# Patient Record
Sex: FEMALE | Race: WHITE | URBAN - METROPOLITAN AREA
[De-identification: names, ages, dates, MRNs, and addresses within clinical notes are randomized per-mention and may not be internally consistent; named-entity substitution may affect disease eponyms.]

---

## 2023-07-10 ENCOUNTER — TELEPHONE (OUTPATIENT)
Dept: SURGERY | Age: 63
End: 2023-07-10

## 2023-07-23 NOTE — PROGRESS NOTES
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 female who was referred for above concerns. Symptom onset began at the age 12 years when knee swelling and pain began, then it affected the neck, lower spine then feet and finally hands. Over the years, she was not  referred to rheumatologic because her blood work was fine. She was treated with mobic, celebrex and naproxen. She is currently on mobic 15mg daily and tylenol 1gm bid. She has tried PT for knees and lower back    Disease progression:   Today she has neck, low back pain, hands and feet   There is swelling in her hands   Joint stiffness in am lasting for hours   Relieving factors: pain meds   Worsening factors: activity   Associated symptoms: low grade, 99-100F,  SOB+   Pain level today: 6/10  Sees pulmonologist for lung nodules and follows with endocrinologist for thyroid nodules. Gall bladder nodules was recently found. She is a  at the [employer]  Functional status: walks with a walker     Other rheumatologic symptoms :  Denies chest pain, SOB, fever, rash, oral/nasal ulcers, change in mental status, sicca symptoms, Muscle pain, muscle weakness, raynaud's, puffy fingers or skin thickening. History of  blood clots, dactylitis, uveitis, enthesitis, psoriasis, buttock pain, change in BM    Risk factors: Denies smoking, no etoh, + obesity,no recreational drug use,her parents had arthritis / deformity. Her grandparents also had arthritis. No hx of psoriasis     Current rheum meds: Mobic 15mg daily     Past rheum meds:     No flowsheet data found.         REVIEW OF SYSTEMS     Constitutional:  Denies fever or chills, decreased appetite, or weight loss   Eyes:

## 2023-07-26 ENCOUNTER — OFFICE VISIT (OUTPATIENT)
Dept: RHEUMATOLOGY | Age: 63
End: 2023-07-26
Payer: OTHER GOVERNMENT

## 2023-07-26 ENCOUNTER — OFFICE VISIT (OUTPATIENT)
Dept: SURGERY | Age: 63
End: 2023-07-26
Payer: OTHER GOVERNMENT

## 2023-07-26 VITALS
WEIGHT: 236.4 LBS | HEIGHT: 59 IN | HEART RATE: 100 BPM | OXYGEN SATURATION: 96 % | BODY MASS INDEX: 47.66 KG/M2 | DIASTOLIC BLOOD PRESSURE: 68 MMHG | SYSTOLIC BLOOD PRESSURE: 118 MMHG

## 2023-07-26 VITALS
HEART RATE: 100 BPM | SYSTOLIC BLOOD PRESSURE: 110 MMHG | OXYGEN SATURATION: 97 % | DIASTOLIC BLOOD PRESSURE: 80 MMHG | WEIGHT: 236 LBS

## 2023-07-26 DIAGNOSIS — Z87.898 HISTORY OF MULTIPLE PULMONARY NODULES: ICD-10-CM

## 2023-07-26 DIAGNOSIS — M15.4 EROSIVE OSTEOARTHRITIS: ICD-10-CM

## 2023-07-26 DIAGNOSIS — M25.50 POLYARTHRALGIA: Primary | ICD-10-CM

## 2023-07-26 DIAGNOSIS — R06.02 SOB (SHORTNESS OF BREATH): ICD-10-CM

## 2023-07-26 DIAGNOSIS — R93.41 ABNORMAL ULTRASOUND OF BLADDER: Primary | ICD-10-CM

## 2023-07-26 DIAGNOSIS — Z01.89 ENCOUNTER FOR OTHER SPECIFIED SPECIAL EXAMINATIONS: ICD-10-CM

## 2023-07-26 PROCEDURE — 99205 OFFICE O/P NEW HI 60 MIN: CPT | Performed by: STUDENT IN AN ORGANIZED HEALTH CARE EDUCATION/TRAINING PROGRAM

## 2023-07-26 PROCEDURE — 99203 OFFICE O/P NEW LOW 30 MIN: CPT | Performed by: SURGERY

## 2023-07-26 RX ORDER — LISINOPRIL AND HYDROCHLOROTHIAZIDE 12.5; 1 MG/1; MG/1
1 TABLET ORAL DAILY
COMMUNITY

## 2023-07-26 RX ORDER — MELOXICAM 15 MG/1
15 TABLET ORAL DAILY
COMMUNITY

## 2023-07-26 RX ORDER — OMEPRAZOLE 40 MG/1
40 CAPSULE, DELAYED RELEASE ORAL DAILY
COMMUNITY

## 2023-07-26 RX ORDER — METFORMIN HYDROCHLORIDE 500 MG/1
500 TABLET, EXTENDED RELEASE ORAL
COMMUNITY

## 2023-07-26 ASSESSMENT — PATIENT HEALTH QUESTIONNAIRE - PHQ9
SUM OF ALL RESPONSES TO PHQ QUESTIONS 1-9: 0
SUM OF ALL RESPONSES TO PHQ QUESTIONS 1-9: 0
SUM OF ALL RESPONSES TO PHQ9 QUESTIONS 1 & 2: 0
SUM OF ALL RESPONSES TO PHQ QUESTIONS 1-9: 0
1. LITTLE INTEREST OR PLEASURE IN DOING THINGS: 0
2. FEELING DOWN, DEPRESSED OR HOPELESS: 0
SUM OF ALL RESPONSES TO PHQ QUESTIONS 1-9: 0

## 2023-07-28 NOTE — PROGRESS NOTES
SUBJECTIVE:  HPI:     Patient here for evaluation for cholelithiasis. She has 1.9 cm gallstone without findings of acute cholecystitis. Patient denies postprandial pain. Denies right upper quadrant pain. She does report some occasional nausea and vomiting but not associated with meals. Denies changes in bowel habits. Denies fever/chills. No past surgical history on file. No past medical history on file. No family history on file.   Social History     Socioeconomic History    Marital status:      Spouse name: Not on file    Number of children: Not on file    Years of education: Not on file    Highest education level: Not on file   Occupational History    Not on file   Tobacco Use    Smoking status: Never    Smokeless tobacco: Never   Substance and Sexual Activity    Alcohol use: Not Currently    Drug use: Never    Sexual activity: Not on file   Other Topics Concern    Not on file   Social History Narrative    Not on file     Social Determinants of Health     Financial Resource Strain: Not on file   Food Insecurity: Not on file   Transportation Needs: Not on file   Physical Activity: Not on file   Stress: Not on file   Social Connections: Not on file   Intimate Partner Violence: Not on file   Housing Stability: Not on file       Current Outpatient Medications   Medication Sig Dispense Refill    omeprazole (PRILOSEC) 40 MG delayed release capsule Take 1 capsule by mouth daily      lisinopril-hydroCHLOROthiazide (PRINZIDE;ZESTORETIC) 10-12.5 MG per tablet Take 1 tablet by mouth daily      meloxicam (MOBIC) 15 MG tablet Take 1 tablet by mouth daily      metFORMIN (GLUCOPHAGE-XR) 500 MG extended release tablet Take 1 tablet by mouth daily (with breakfast)      Multiple Vitamin (MULTIVITAMIN ADULT PO) Take by mouth      Acetaminophen (TYLENOL 8 HOUR PO) Take by mouth      diphenhydrAMINE-APAP, sleep, (TYLENOL PM EXTRA STRENGTH PO) Take by mouth       No current facility-administered medications for this

## 2023-07-29 ENCOUNTER — HOSPITAL ENCOUNTER (OUTPATIENT)
Dept: GENERAL RADIOLOGY | Age: 63
Discharge: HOME | End: 2023-07-29
Payer: OTHER GOVERNMENT

## 2023-07-29 ENCOUNTER — HOSPITAL ENCOUNTER (OUTPATIENT)
Age: 63
Discharge: HOME | End: 2023-07-29
Payer: OTHER GOVERNMENT

## 2023-07-29 DIAGNOSIS — M15.4 EROSIVE OSTEOARTHRITIS: ICD-10-CM

## 2023-07-29 DIAGNOSIS — Z01.89 ENCOUNTER FOR OTHER SPECIFIED SPECIAL EXAMINATIONS: ICD-10-CM

## 2023-07-29 DIAGNOSIS — M25.50 POLYARTHRALGIA: ICD-10-CM

## 2023-07-29 DIAGNOSIS — R06.02 SOB (SHORTNESS OF BREATH): ICD-10-CM

## 2023-07-29 DIAGNOSIS — Z87.898 HISTORY OF MULTIPLE PULMONARY NODULES: ICD-10-CM

## 2023-07-29 LAB
25(OH)D3 SERPL-MCNC: 69.94 NG/ML
ALBUMIN SERPL-MCNC: 4.1 GM/DL (ref 3.4–5)
ALBUMIN SERPL-MCNC: 4.1 GM/DL (ref 3.4–5)
ALP BLD-CCNC: 82 IU/L (ref 40–129)
ALT SERPL-CCNC: 25 U/L (ref 10–40)
ANION GAP SERPL CALCULATED.3IONS-SCNC: 12 MMOL/L (ref 4–16)
AST SERPL-CCNC: 17 IU/L (ref 15–37)
BILIRUB SERPL-MCNC: 0.4 MG/DL (ref 0–1)
BILIRUBIN DIRECT: 0.2 MG/DL (ref 0–0.3)
BILIRUBIN, INDIRECT: 0.2 MG/DL (ref 0–0.7)
BUN SERPL-MCNC: 17 MG/DL (ref 6–23)
CALCIUM SERPL-MCNC: 9.7 MG/DL (ref 8.3–10.6)
CHLORIDE BLD-SCNC: 98 MMOL/L (ref 99–110)
CO2: 28 MMOL/L (ref 21–32)
CREAT SERPL-MCNC: 0.7 MG/DL (ref 0.6–1.1)
CRP SERPL HS-MCNC: 4 MG/L
ERYTHROCYTE SEDIMENTATION RATE: 7 MM/HR (ref 0–30)
GFR SERPL CREATININE-BSD FRML MDRD: >60 ML/MIN/1.73M2
GLUCOSE SERPL-MCNC: 173 MG/DL (ref 70–99)
HAV IGM SERPL QL IA: NON REACTIVE
HBV CORE IGM SERPL QL IA: NON REACTIVE
HBV SURFACE AG SERPL QL IA: NON REACTIVE
HCT VFR BLD CALC: 41.8 % (ref 37–47)
HCV AB SERPL QL IA: NON REACTIVE
HEMOGLOBIN: 13.1 GM/DL (ref 12.5–16)
MCH RBC QN AUTO: 29 PG (ref 27–31)
MCHC RBC AUTO-ENTMCNC: 31.3 % (ref 32–36)
MCV RBC AUTO: 92.7 FL (ref 78–100)
PDW BLD-RTO: 12.9 % (ref 11.7–14.9)
PHOSPHORUS: 3.1 MG/DL (ref 2.5–4.9)
PLATELET # BLD: 281 K/CU MM (ref 140–440)
PMV BLD AUTO: 11 FL (ref 7.5–11.1)
POTASSIUM SERPL-SCNC: 3.8 MMOL/L (ref 3.5–5.1)
RBC # BLD: 4.51 M/CU MM (ref 4.2–5.4)
SODIUM BLD-SCNC: 138 MMOL/L (ref 135–145)
TOTAL PROTEIN: 6.2 GM/DL (ref 6.4–8.2)
WBC # BLD: 7.2 K/CU MM (ref 4–10.5)

## 2023-07-29 PROCEDURE — 72100 X-RAY EXAM L-S SPINE 2/3 VWS: CPT

## 2023-07-29 PROCEDURE — 86480 TB TEST CELL IMMUN MEASURE: CPT

## 2023-07-29 PROCEDURE — 80074 ACUTE HEPATITIS PANEL: CPT

## 2023-07-29 PROCEDURE — 85652 RBC SED RATE AUTOMATED: CPT

## 2023-07-29 PROCEDURE — 86812 HLA TYPING A B OR C: CPT

## 2023-07-29 PROCEDURE — 82306 VITAMIN D 25 HYDROXY: CPT

## 2023-07-29 PROCEDURE — 73130 X-RAY EXAM OF HAND: CPT

## 2023-07-29 PROCEDURE — 86200 CCP ANTIBODY: CPT

## 2023-07-29 PROCEDURE — 82248 BILIRUBIN DIRECT: CPT

## 2023-07-29 PROCEDURE — 36415 COLL VENOUS BLD VENIPUNCTURE: CPT

## 2023-07-29 PROCEDURE — 85027 COMPLETE CBC AUTOMATED: CPT

## 2023-07-29 PROCEDURE — 86430 RHEUMATOID FACTOR TEST QUAL: CPT

## 2023-07-29 PROCEDURE — 86140 C-REACTIVE PROTEIN: CPT

## 2023-07-29 PROCEDURE — 80053 COMPREHEN METABOLIC PANEL: CPT

## 2023-07-29 PROCEDURE — 72202 X-RAY EXAM SI JOINTS 3/> VWS: CPT

## 2023-07-29 PROCEDURE — 71046 X-RAY EXAM CHEST 2 VIEWS: CPT

## 2023-07-29 PROCEDURE — 84100 ASSAY OF PHOSPHORUS: CPT

## 2023-07-31 ENCOUNTER — TELEPHONE (OUTPATIENT)
Dept: RHEUMATOLOGY | Age: 63
End: 2023-07-31

## 2023-07-31 NOTE — TELEPHONE ENCOUNTER
Pt called into the office stating when she was 7yrs old she was + for tb - she wanted you to be aware - she looked at her lab orders and did not know if there would be a false +

## 2023-08-01 LAB
CCP IGG SERPL-ACNC: 2 UNITS (ref 0–19)
HLA-B27 QL FC: NEGATIVE
RHEUMATOID FACTOR: <10 IU/ML (ref 0–14)

## 2023-08-02 LAB
QUANTI TB1 MINUS NIL: 0.05 IU/ML (ref 0–0.34)
QUANTI TB2 MINUS NIL: 0 IU/ML (ref 0–0.34)
QUANTIFERON (R) TB GOLD (INCUBATED): NEGATIVE IU/ML
QUANTIFERON MITOGEN MINUS NIL: >10 IU/ML
QUANTIFERON NIL: 0.05 IU/ML

## 2023-09-09 NOTE — PROGRESS NOTES
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 female who established on 2023. Symptom onset began at the age 12 years when knee swelling and pain began, then it affected the neck, lower spine then feet and finally hands. Over the years, she was not  referred to rheumatologic because her blood work was fine. She was treated with mobic, celebrex and naproxen. She is currently on mobic 15mg daily and tylenol 1gm bid. She has tried PT for knees and lower back    LCV: 2023  HLA-B27 negative  CCP negative  CRP negative  ESR negative  WBC, hemoglobin, platelets negative  QuantiFERON negative  RF negative  Hand x-rays support erosive OA  SI joints showed mild sclerosis and small erosions bilaterally concerning for sacroiliitis. Subjective  She is here to discuss her results   Her back hurts   Hands are hurting and she has bony swelling   Joint stiffness in am lasting for hours   Relieving factors: pain meds   Worsening factors: activity   Pain level today: 4/10  Sees pulmonologist for lung nodules and follows with endocrinologist for thyroid nodules. Gall bladder nodules was recently found.   She is a  at the [employer]  Functional status: walks with a walker     Risk factors: + obesity    Current rheum meds: Mobic 15mg daily     Past rheum meds:          No data to display                    REVIEW OF SYSTEMS     Constitutional:  Denies fever or chills, decreased appetite, or weight loss   Eyes:  Denies change in visual acuity or eye dryness or irritation  HENT:  Denies dry mouth or oral ulcers  Respiratory: SOB+   Cardiovascular:  Denies chest pain or edema   GI:  Denies abdominal pain, nausea, vomiting, bloody

## 2023-09-11 ENCOUNTER — OFFICE VISIT (OUTPATIENT)
Dept: RHEUMATOLOGY | Age: 63
End: 2023-09-11
Payer: OTHER GOVERNMENT

## 2023-09-11 VITALS
SYSTOLIC BLOOD PRESSURE: 100 MMHG | WEIGHT: 233 LBS | BODY MASS INDEX: 47.06 KG/M2 | HEART RATE: 103 BPM | OXYGEN SATURATION: 96 % | DIASTOLIC BLOOD PRESSURE: 70 MMHG

## 2023-09-11 DIAGNOSIS — M25.50 POLYARTHRALGIA: ICD-10-CM

## 2023-09-11 DIAGNOSIS — M15.9 PRIMARY OSTEOARTHRITIS INVOLVING MULTIPLE JOINTS: Primary | ICD-10-CM

## 2023-09-11 PROCEDURE — 99215 OFFICE O/P EST HI 40 MIN: CPT | Performed by: STUDENT IN AN ORGANIZED HEALTH CARE EDUCATION/TRAINING PROGRAM

## 2023-09-11 NOTE — PATIENT INSTRUCTIONS
Schedule MRI lumbar spine and pelvis  RTC as needed if MRI shows sacroiliitis  Plan to refer to ortho spine / pain clinic after we  review MRI results